# Patient Record
Sex: FEMALE | Race: WHITE | NOT HISPANIC OR LATINO | Employment: FULL TIME | URBAN - NONMETROPOLITAN AREA
[De-identification: names, ages, dates, MRNs, and addresses within clinical notes are randomized per-mention and may not be internally consistent; named-entity substitution may affect disease eponyms.]

---

## 2022-01-03 ENCOUNTER — OFFICE VISIT (OUTPATIENT)
Dept: FAMILY MEDICINE | Facility: CLINIC | Age: 42
End: 2022-01-03

## 2022-01-03 VITALS
RESPIRATION RATE: 18 BRPM | SYSTOLIC BLOOD PRESSURE: 118 MMHG | DIASTOLIC BLOOD PRESSURE: 76 MMHG | TEMPERATURE: 98 F | BODY MASS INDEX: 21.97 KG/M2 | HEIGHT: 67 IN | WEIGHT: 140 LBS | HEART RATE: 80 BPM | OXYGEN SATURATION: 98 %

## 2022-01-03 DIAGNOSIS — Z20.822 CONTACT WITH AND (SUSPECTED) EXPOSURE TO COVID-19: ICD-10-CM

## 2022-01-03 DIAGNOSIS — U07.1 COVID-19: Primary | ICD-10-CM

## 2022-01-03 LAB
CTP QC/QA: YES
FLUAV AG NPH QL: NEGATIVE
FLUBV AG NPH QL: NEGATIVE
SARS-COV-2 AG RESP QL IA.RAPID: POSITIVE

## 2022-01-03 PROCEDURE — 99203 OFFICE O/P NEW LOW 30 MIN: CPT | Mod: ,,, | Performed by: NURSE PRACTITIONER

## 2022-01-03 PROCEDURE — 99203 PR OFFICE/OUTPT VISIT, NEW, LEVL III, 30-44 MIN: ICD-10-PCS | Mod: ,,, | Performed by: NURSE PRACTITIONER

## 2022-01-03 PROCEDURE — 87428 POCT SARS-COV2 (COVID) WITH FLU ANTIGEN: ICD-10-PCS | Mod: QW,,, | Performed by: NURSE PRACTITIONER

## 2022-01-03 PROCEDURE — 87428 SARSCOV & INF VIR A&B AG IA: CPT | Mod: QW,,, | Performed by: NURSE PRACTITIONER

## 2022-01-03 RX ORDER — ACETAMINOPHEN AND CODEINE PHOSPHATE 120; 12 MG/5ML; MG/5ML
1 SOLUTION ORAL DAILY
COMMUNITY
Start: 2021-12-10

## 2022-01-03 NOTE — PROGRESS NOTES
SCOTT Rae   [facility]  [address]  [place] [address]     PATIENT NAME: Colleen Celis  : 1980  DATE: 1/3/22  MRN: [id number]      Billing Provider: SCOTT Rae  Level of Service: [place] OFF[facility]  Patient PCP Information     Provider PCP Type    SCOTT Rae General          Reason for Visit / Chief Complaint: Nasal Congestion (Sinus pressure.  Started Saturday.  Has been exposed to Covid.), Cough (Dry cough.  Denies fever,n/v/d or loss of smell or taste.), and Generalized Body Aches       Update PCP  Update Chief Complaint         History of Present Illness / Problem Focused Workflow     Colleen Celis presents to the clinic c/o sinus congestion, pressure, cough and muscle aches for the past 3 days. Denies SOB, no known fever.      Review of Systems     Review of Systems   Constitutional: Positive for fatigue. Negative for activity change, appetite change, chills and fever.   HENT: Positive for nasal congestion, rhinorrhea and sinus pressure/congestion. Negative for ear pain and sore throat.    Eyes: Negative for visual disturbance.   Respiratory: Positive for cough. Negative for chest tightness, shortness of breath and wheezing.    Cardiovascular: Negative for chest pain.   Gastrointestinal: Negative for abdominal pain, nausea and vomiting.   Neurological: Positive for headaches. Negative for dizziness and weakness.        Medical / Social / Family History   History reviewed. No pertinent past medical history.    Past Surgical History:   Procedure Laterality Date    AUGMENTATION OF BREAST      DILATION AND CURETTAGE OF UTERUS         Social History  Ms.  reports that she has never smoked. She has never used smokeless tobacco. She reports current alcohol use. She reports that she does not use drugs.    Family History  Ms.'s family history includes Diabetes in her mother; Hyperlipidemia in her brother and sister;  "Hypertension in her mother.    Medications and Allergies     Medications  Outpatient Medications Marked as Taking for the 1/3/22 encounter (Office Visit) with SCOTT Spear   Medication Sig Dispense Refill    norethindrone (MICRONOR) 0.35 mg tablet Take 1 tablet by mouth once daily.         Allergies  Review of patient's allergies indicates:  No Known Allergies    Physical Examination     Vitals:    01/03/22 1453   BP: 118/76   BP Location: Left arm   Patient Position: Sitting   BP Method: Large (Manual)   Pulse: 80   Resp: 18   Temp: 98.4 °F (36.9 °C)   TempSrc: Oral   SpO2: 98%   Weight: 63.5 kg (140 lb)   Height: 5' 6.5" (1.689 m)      Physical Exam  Constitutional:       Appearance: Normal appearance.   HENT:      Head: Normocephalic.      Right Ear: Hearing and ear canal normal. No tenderness. Tympanic membrane is not injected.      Left Ear: Hearing and ear canal normal. No tenderness. Tympanic membrane is not injected.      Nose: Congestion present. No nasal deformity.      Right Turbinates: Swollen.      Left Turbinates: Swollen.      Right Sinus: Maxillary sinus tenderness present.      Left Sinus: Maxillary sinus tenderness present.      Mouth/Throat:      Lips: Pink.      Mouth: Mucous membranes are moist.      Pharynx: No oropharyngeal exudate or posterior oropharyngeal erythema.      Tonsils: No tonsillar exudate.   Eyes:      General: Lids are normal. No allergic shiner.        Right eye: No discharge.         Left eye: No discharge.      Conjunctiva/sclera:      Right eye: Right conjunctiva is not injected.      Left eye: Left conjunctiva is not injected.   Cardiovascular:      Rate and Rhythm: Normal rate and regular rhythm.      Pulses: Normal pulses.      Heart sounds: Normal heart sounds.   Pulmonary:      Effort: Pulmonary effort is normal. No tachypnea, accessory muscle usage or respiratory distress.      Breath sounds: Normal breath sounds.   Abdominal:      General: Bowel sounds are " normal.      Palpations: Abdomen is soft.   Musculoskeletal:      Cervical back: Neck supple.   Lymphadenopathy:      Cervical: Cervical adenopathy present.   Skin:     General: Skin is warm and dry.   Neurological:      Mental Status: She is alert.          Assessment and Plan (including Health Maintenance)      Problem List  Smart Sets  Document Outside            Health Maintenance Due   Topic Date Due    Hepatitis C Screening  Never done    Lipid Panel  Never done    COVID-19 Vaccine (1) Never done    HIV Screening  Never done    TETANUS VACCINE  Never done    Mammogram  Never done    Cervical Cancer Screening  Never done    Influenza Vaccine (1) Never done       Problem List Items Addressed This Visit    None     Visit Diagnoses     COVID-19    -  Primary    Will treat COVID with zithromax and prednisone taper. Quarantine for 10 days.  May use OTC flonase for congestion.    Contact with and (suspected) exposure to covid-19        Relevant Orders    POCT SARS-COV2 (COVID) with Flu Antigen (Completed)        COVID-19  Comments:  Will treat COVID with zithromax and prednisone taper. Quarantine for 10 days.  May use OTC flonase for congestion.    Contact with and (suspected) exposure to covid-19  -     POCT SARS-COV2 (COVID) with Flu Antigen       The patient has no Health Maintenance topics of status Not Due        Follow up in about 1 week (around 1/10/2022), or if symptoms worsen or fail to improve.     Signature:  SCOTT Rae    Date of encounter: 1/3/22